# Patient Record
Sex: FEMALE | Race: OTHER | URBAN - METROPOLITAN AREA
[De-identification: names, ages, dates, MRNs, and addresses within clinical notes are randomized per-mention and may not be internally consistent; named-entity substitution may affect disease eponyms.]

---
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
## 2022-11-16 ENCOUNTER — OFFICE VISIT (OUTPATIENT)
Dept: FAMILY MEDICINE CLINIC | Age: 22
End: 2022-11-16

## 2022-11-16 VITALS
HEIGHT: 58 IN | OXYGEN SATURATION: 100 % | HEART RATE: 102 BPM | SYSTOLIC BLOOD PRESSURE: 118 MMHG | TEMPERATURE: 98.6 F | WEIGHT: 103.6 LBS | DIASTOLIC BLOOD PRESSURE: 66 MMHG | BODY MASS INDEX: 21.75 KG/M2

## 2022-11-16 DIAGNOSIS — Z3A.12 12 WEEKS GESTATION OF PREGNANCY: Primary | ICD-10-CM

## 2022-11-16 LAB
HCG URINE, QL. (POC): POSITIVE
VALID INTERNAL CONTROL?: YES

## 2022-11-16 PROCEDURE — 81025 URINE PREGNANCY TEST: CPT | Performed by: FAMILY MEDICINE

## 2022-11-16 PROCEDURE — 99203 OFFICE O/P NEW LOW 30 MIN: CPT | Performed by: FAMILY MEDICINE

## 2022-11-16 NOTE — PROGRESS NOTES
HISTORY OF PRESENT ILLNESS  Kamar Mahan is a 23 y.o. female. HPI  LMP 8/19/22, she has been feeling dizzy, and nauseous. She does feel some movement in her pelvic area  Review of Systems   Constitutional:  Negative for chills, fever and weight loss. Eyes:  Negative for blurred vision, double vision and photophobia. Respiratory:  Negative for cough, sputum production, shortness of breath and wheezing. Cardiovascular:  Negative for chest pain, palpitations, orthopnea and claudication. Gastrointestinal:  Negative for heartburn, nausea and vomiting. Genitourinary:  Negative for dysuria, frequency and urgency. /66 (BP 1 Location: Right upper arm, BP Patient Position: Sitting, BP Cuff Size: Adult)   Pulse (!) 102   Temp 98.6 °F (37 °C) (Temporal)   Ht 4' 10.47\" (1.485 m)   Wt 103 lb 9.6 oz (47 kg)   LMP 08/19/2022 (Approximate)   SpO2 100%   BMI 21.31 kg/m²   Physical Exam  Constitutional:       General: She is not in acute distress. HENT:      Head: Normocephalic. Right Ear: Tympanic membrane normal.      Left Ear: Tympanic membrane normal.   Cardiovascular:      Rate and Rhythm: Normal rate and regular rhythm. Pulses: Normal pulses. Heart sounds: Normal heart sounds. No murmur heard. Pulmonary:      Effort: Pulmonary effort is normal.      Breath sounds: Normal breath sounds. Abdominal:      General: There is distension. Palpations: Abdomen is soft. Comments: Gravid uterus palpable at umbilicus   Musculoskeletal:      Cervical back: Normal range of motion. No rigidity. Neurological:      Mental Status: She is alert.        ASSESSMENT and PLAN  Diagnoses and all orders for this visit:    1. 12 weeks gestation of pregnancy  -     AMB POC URINE PREGNANCY TEST, VISUAL COLOR COMPARISON      23year old female patient at 12 weeks of gestation based on the LMP given by patient,  to physical exam, seems to be further than LMP,  we will refer to OB to start prenatal care  Continue prenatal vitamins

## 2022-11-16 NOTE — PROGRESS NOTES
Coordination of Care  1. Have you been to the ER, urgent care clinic since your last visit? Hospitalized since your last visit? No    2. Have you seen or consulted any other health care providers outside of the [address] [place] [address] since your last visit? Include any pap smears or colon screening. No    Does the patient need refills? NO    Learning Assessment Complete?  yes  Depression Screening complete in the past 12 months? yes    Results for orders placed or performed in visit on 11/16/22   AMB POC URINE PREGNANCY TEST, VISUAL COLOR COMPARISON   Result Value Ref Range    VALID INTERNAL CONTROL POC Yes     HCG urine, Ql. (POC) Positive Negative

## 2022-11-16 NOTE — PROGRESS NOTES
Name and  confirmed w/ patient. An After Visit Summary was provided and all discharge instructions were reviewed with the patient including: prenatal resources. Patient stated she would like to try to go to [facility] for prenatal care. She will attempt to call on her own, but contact us if she has any problems. She is already taking a prenatal vitamin and will continue. Time for questions and answers provided, patient verbalized understanding. Patient discharged from clinic in stable condition. PATRICIA  Alex Dejesus assisted with this d/c.

## 2023-01-19 LAB
ANTIBODY SCREEN, EXTERNAL: NEGATIVE
CHLAMYDIA, EXTERNAL: NEGATIVE
HBSAG, EXTERNAL: NEGATIVE
HEPATITIS C AB,   EXT: NEGATIVE
HIV, EXTERNAL: NEGATIVE
N. GONORRHEA, EXTERNAL: NEGATIVE
RPR, EXTERNAL: NON REACTIVE
RUBELLA, EXTERNAL: NORMAL
TYPE, ABO & RH, EXTERNAL: NORMAL

## 2023-02-23 DIAGNOSIS — O26.843 UTERINE SIZE-DATE DISCREPANCY IN THIRD TRIMESTER: Primary | ICD-10-CM

## 2023-02-27 ENCOUNTER — ROUTINE PRENATAL (OUTPATIENT)
Dept: FAMILY MEDICINE CLINIC | Age: 23
End: 2023-02-27
Payer: COMMERCIAL

## 2023-02-27 VITALS — RESPIRATION RATE: 17 BRPM | HEIGHT: 58 IN | WEIGHT: 112 LBS | BODY MASS INDEX: 23.51 KG/M2 | OXYGEN SATURATION: 98 %

## 2023-02-27 DIAGNOSIS — Z3A.37 37 WEEKS GESTATION OF PREGNANCY: Primary | ICD-10-CM

## 2023-02-27 PROBLEM — O99.019 ANEMIA OF PREGNANCY: Status: ACTIVE | Noted: 2023-02-27

## 2023-02-27 RX ORDER — LANOLIN ALCOHOL/MO/W.PET/CERES
CREAM (GRAM) TOPICAL
COMMUNITY
Start: 2023-01-24

## 2023-02-27 RX ORDER — BREAST PUMP
EACH MISCELLANEOUS AS DIRECTED
COMMUNITY
Start: 2023-02-23

## 2023-02-27 NOTE — PROGRESS NOTES
Return OB Visit     AMN   Subjective:   Kamar Oliver Corpus 23 y.o.   LUIS ENRIQUE: Not found. GA:  Unknown. Concerns today: none    LOF: No  Vaginal bleeding: No  Fetal movement (after 20 weeks): Yes  Contractions: No    Pt denies fever, chills, HA, vision disturbances, RUQ pain, chest pain, SOB, N/V/D, constipation, urinary problems, foul smelling vaginal discharge, LE Edema worse than usual.     OB History    Para Term  AB Living   1             SAB IAB Ectopic Molar Multiple Live Births                    # Outcome Date GA Lbr Justo/2nd Weight Sex Delivery Anes PTL Lv   1 Current                Allergies- reviewed:   No Known Allergies  Medications- reviewed:   Current Outpatient Medications   Medication Sig    PNV Comb #2-Iron-FA-Omega 3 29-1-400 mg cmpk Take  by mouth. Breast Pump najma as directed. ferrous sulfate 325 mg (65 mg iron) tablet 1 tablet     No current facility-administered medications for this visit. Past Medical History- reviewed:  History reviewed. No pertinent past medical history. Past Surgical History- reviewed:   History reviewed. No pertinent surgical history.   Social History- reviewed:  Social History     Socioeconomic History    Marital status: SINGLE     Spouse name: Not on file    Number of children: Not on file    Years of education: Not on file    Highest education level: Not on file   Occupational History    Not on file   Tobacco Use    Smoking status: Never    Smokeless tobacco: Never   Substance and Sexual Activity    Alcohol use: Never    Drug use: Never    Sexual activity: Not on file   Other Topics Concern    Not on file   Social History Narrative    Not on file     Social Determinants of Health     Financial Resource Strain: Not on file   Food Insecurity: No Food Insecurity    Worried About Running Out of Food in the Last Year: Never true    Ran Out of Food in the Last Year: Never true   Transportation Needs: Not on file   Physical Activity: Not on file   Stress: Not on file   Social Connections: Not on file   Intimate Partner Violence: Not on file   Housing Stability: Low Risk     Unable to Pay for Housing in the Last Year: No    Number of Places Lived in the Last Year: 1    Unstable Housing in the Last Year: No     Immunizations- reviewed:   Immunization History   Administered Date(s) Administered    Tdap 2023       Objective:   Visit Vitals  BP (P) 115/70   Pulse (P) 84   Temp (P) 98.2 °F (36.8 °C) (Oral)   Resp 17   Ht 4' 10\" (1.473 m)   Wt 112 lb (50.8 kg)   LMP 2022 (Approximate)   BMI 23.41 kg/m²       Physical Exam:  GENERAL APPEARANCE: alert, well appearing, in no apparent distress  ABDOMEN: gravid, Fundal height 30 FHT present at 145  bpm  PSYCH: normal mood and affect     Labs  No results found for this or any previous visit (from the past 12 hour(s)). Assessment         ICD-10-CM ICD-9-CM    1. 37 weeks gestation of pregnancy  Z3A.37 V22.2             Plan   23 y.o.  Unknown LUIS ENRIQUE Not found. here for return OB visit     1. SIUP at Unknown  -PNL:O+, Ab screen neg, Hgb frac wnl, Hgb 9.5. G/C/Tpall/HIV/Hep B/Hep C negative. Rubella/varicella immune. UA wnl, Ucx mixed urogenital caleb. 3rd tri Hgb 10.3, G/G/RPR/HIV neg. 1hr GTT wnl. GBS negative.  -Immunizations: s/p TDAP  -Ultrasound: 23, EGA more advanced than LMP (31w6d instead of 21w6d). Limited views but normal anatomy, EFW 53%ile. Fundal placenta. Cephalic. Repeat anatomy and growth scan scheduled . PREGNANCY CONCERNS    Anemia of pregnancy: 3rd trimester Hgb 10.3. Pt on iron supplementation. BIRTH PLAN  Continuity Provider: Pop matias. in labor: Considering epidural, would like to keep options open  Postpartum contraception: considering options  Feeding plan: breast  Social: Denies Tobacco, EtoH or illict drugs. Orders Placed This Encounter    PNV Comb #2-Iron-FA-Omega 3 29-1-400 mg cmpk     Sig: Take  by mouth.     Breast Pump najma     Sig: as directed. ferrous sulfate 325 mg (65 mg iron) tablet     Si tablet     Labor precautions discussed, including: Regular painful contractions, lasting for greater than one hour, taking your breath away; any vaginal bleeding; any leakage of fluid; or absent or decreased fetal movement. Call M.D. on call if any of these symptoms or signs occur. I have discussed the diagnosis with the patient and the intended plan as seen in the above orders. The patient has received an after-visit summary and questions were answered concerning future plans. I have discussed medication side effects and warnings with the patient as well. Informed pt to return to the office or go to the ER if she experiences vaginal bleeding, vaginal discharge, leaking of fluid, pelvic cramping.     Pt seen and discussed with Dr. Mo Mcdaniel (attending physician)    Sonia Castro MD  Family Medicine Resident

## 2023-02-27 NOTE — PROGRESS NOTES
I reviewed with the resident the medical history and the resident's findings on the physical examination. I discussed with the resident the patient's diagnosis and concur with the plan.     21yo G1 @ 37w2d by 31 wk scan   IUP: has MFM scan scheduled tomorrow, GTT normal, GBS neg   Anemia: on iron, hbg was 10.3 on last check 2/23  Poor dating criteria: 31wk scan

## 2023-02-27 NOTE — PROGRESS NOTES
Chief Complaint   Patient presents with    Routine Prenatal Visit     . No bleeding or LOF.  +FM. 1. Have you been to the ER, urgent care clinic since your last visit? Hospitalized since your last visit? N/A.    2. Have you seen or consulted any other health care providers outside of the [address] [place] [address] since your last visit? Include any pap smears or colon screening.  N/A.

## 2023-02-28 ENCOUNTER — HOSPITAL ENCOUNTER (OUTPATIENT)
Dept: PERINATAL CARE | Age: 23
Discharge: HOME | End: 2023-02-28
Attending: OBSTETRICS & GYNECOLOGY
Payer: COMMERCIAL

## 2023-02-28 LAB — GRBS, EXTERNAL: NEGATIVE

## 2023-02-28 PROCEDURE — 76805 OB US >/= 14 WKS SNGL FETUS: CPT | Performed by: OBSTETRICS & GYNECOLOGY

## 2023-03-06 ENCOUNTER — ANESTHESIA (OUTPATIENT)
Dept: LABOR AND DELIVERY | Age: 23
DRG: 560 | End: 2023-03-06
Payer: COMMERCIAL

## 2023-03-06 ENCOUNTER — HOSPITAL ENCOUNTER (INPATIENT)
Age: 23
LOS: 2 days | Discharge: HOME | DRG: 560 | End: 2023-03-08
Attending: FAMILY MEDICINE | Admitting: FAMILY MEDICINE
Payer: COMMERCIAL

## 2023-03-06 ENCOUNTER — ANESTHESIA EVENT (OUTPATIENT)
Dept: LABOR AND DELIVERY | Age: 23
DRG: 560 | End: 2023-03-06
Payer: COMMERCIAL

## 2023-03-06 DIAGNOSIS — O99.019 ANTEPARTUM ANEMIA: Primary | ICD-10-CM

## 2023-03-06 DIAGNOSIS — Z34.90 PREGNANCY, UNSPECIFIED GESTATIONAL AGE: ICD-10-CM

## 2023-03-06 LAB
ALBUMIN SERPL-MCNC: 2.8 G/DL (ref 3.5–5)
ALBUMIN/GLOB SERPL: 0.8 (ref 1.1–2.2)
ALP SERPL-CCNC: 279 U/L (ref 45–117)
ALT SERPL-CCNC: 11 U/L (ref 12–78)
ANION GAP SERPL CALC-SCNC: 8 MMOL/L (ref 5–15)
AST SERPL-CCNC: 14 U/L (ref 15–37)
BASOPHILS # BLD: 0 K/UL (ref 0–0.1)
BASOPHILS NFR BLD: 0 % (ref 0–1)
BILIRUB SERPL-MCNC: 0.5 MG/DL (ref 0.2–1)
BUN SERPL-MCNC: 8 MG/DL (ref 6–20)
BUN/CREAT SERPL: 13 (ref 12–20)
CALCIUM SERPL-MCNC: 9 MG/DL (ref 8.5–10.1)
CHLORIDE SERPL-SCNC: 110 MMOL/L (ref 97–108)
CO2 SERPL-SCNC: 21 MMOL/L (ref 21–32)
CREAT SERPL-MCNC: 0.63 MG/DL (ref 0.55–1.02)
DIFFERENTIAL METHOD BLD: ABNORMAL
EOSINOPHIL # BLD: 0 K/UL (ref 0–0.4)
EOSINOPHIL NFR BLD: 0 % (ref 0–7)
ERYTHROCYTE [DISTWIDTH] IN BLOOD BY AUTOMATED COUNT: 15.8 % (ref 11.5–14.5)
GLOBULIN SER CALC-MCNC: 3.7 G/DL (ref 2–4)
GLUCOSE SERPL-MCNC: 85 MG/DL (ref 65–100)
HCT VFR BLD AUTO: 34.3 % (ref 35–47)
HGB BLD-MCNC: 11 G/DL (ref 11.5–16)
IMM GRANULOCYTES # BLD AUTO: 0.1 K/UL (ref 0–0.04)
IMM GRANULOCYTES NFR BLD AUTO: 1 % (ref 0–0.5)
LYMPHOCYTES # BLD: 1.5 K/UL (ref 0.8–3.5)
LYMPHOCYTES NFR BLD: 17 % (ref 12–49)
MCH RBC QN AUTO: 28.6 PG (ref 26–34)
MCHC RBC AUTO-ENTMCNC: 32.1 G/DL (ref 30–36.5)
MCV RBC AUTO: 89.1 FL (ref 80–99)
MONOCYTES # BLD: 0.5 K/UL (ref 0–1)
MONOCYTES NFR BLD: 6 % (ref 5–13)
NEUTS SEG # BLD: 6.9 K/UL (ref 1.8–8)
NEUTS SEG NFR BLD: 76 % (ref 32–75)
NRBC # BLD: 0 K/UL (ref 0–0.01)
NRBC BLD-RTO: 0 PER 100 WBC
PLATELET # BLD AUTO: 182 K/UL (ref 150–400)
POTASSIUM SERPL-SCNC: 3.8 MMOL/L (ref 3.5–5.1)
PROT SERPL-MCNC: 6.5 G/DL (ref 6.4–8.2)
RBC # BLD AUTO: 3.85 M/UL (ref 3.8–5.2)
SODIUM SERPL-SCNC: 139 MMOL/L (ref 136–145)
TSH SERPL DL<=0.05 MIU/L-ACNC: 1.51 UIU/ML (ref 0.36–3.74)
WBC # BLD AUTO: 9.1 K/UL (ref 3.6–11)

## 2023-03-06 PROCEDURE — 4A1HXCZ MONITORING OF PRODUCTS OF CONCEPTION, CARDIAC RATE, EXTERNAL APPROACH: ICD-10-PCS | Performed by: FAMILY MEDICINE

## 2023-03-06 PROCEDURE — 75410000002 HC LABOR FEE PER 1 HR: Performed by: ADVANCED PRACTICE MIDWIFE

## 2023-03-06 PROCEDURE — 0KQM0ZZ REPAIR PERINEUM MUSCLE, OPEN APPROACH: ICD-10-PCS | Performed by: FAMILY MEDICINE

## 2023-03-06 PROCEDURE — 74011000250 HC RX REV CODE- 250: Performed by: NURSE ANESTHETIST, CERTIFIED REGISTERED

## 2023-03-06 PROCEDURE — 74011250636 HC RX REV CODE- 250/636

## 2023-03-06 PROCEDURE — 75810000275 HC EMERGENCY DEPT VISIT NO LEVEL OF CARE

## 2023-03-06 PROCEDURE — 36415 COLL VENOUS BLD VENIPUNCTURE: CPT

## 2023-03-06 PROCEDURE — 74011250637 HC RX REV CODE- 250/637

## 2023-03-06 PROCEDURE — 84443 ASSAY THYROID STIM HORMONE: CPT

## 2023-03-06 PROCEDURE — 75410000003 HC RECOV DEL/VAG/CSECN EA 0.5 HR: Performed by: ADVANCED PRACTICE MIDWIFE

## 2023-03-06 PROCEDURE — 76060000078 HC EPIDURAL ANESTHESIA: Performed by: NURSE ANESTHETIST, CERTIFIED REGISTERED

## 2023-03-06 PROCEDURE — 93005 ELECTROCARDIOGRAM TRACING: CPT

## 2023-03-06 PROCEDURE — 74011250636 HC RX REV CODE- 250/636: Performed by: OBSTETRICS & GYNECOLOGY

## 2023-03-06 PROCEDURE — 75410000000 HC DELIVERY VAGINAL/SINGLE: Performed by: ADVANCED PRACTICE MIDWIFE

## 2023-03-06 PROCEDURE — 77030005513 HC CATH URETH FOL11 MDII -B

## 2023-03-06 PROCEDURE — 77030014125 HC TY EPDRL BBMI -B: Performed by: ANESTHESIOLOGY

## 2023-03-06 PROCEDURE — 85025 COMPLETE CBC W/AUTO DIFF WBC: CPT

## 2023-03-06 PROCEDURE — 65270000029 HC RM PRIVATE

## 2023-03-06 PROCEDURE — 80053 COMPREHEN METABOLIC PANEL: CPT

## 2023-03-06 RX ORDER — SODIUM CHLORIDE 0.9 % (FLUSH) 0.9 %
5-40 SYRINGE (ML) INJECTION AS NEEDED
Status: DISCONTINUED | OUTPATIENT
Start: 2023-03-06 | End: 2023-03-08 | Stop reason: HOSPADM

## 2023-03-06 RX ORDER — SODIUM CHLORIDE, SODIUM LACTATE, POTASSIUM CHLORIDE, CALCIUM CHLORIDE 600; 310; 30; 20 MG/100ML; MG/100ML; MG/100ML; MG/100ML
125 INJECTION, SOLUTION INTRAVENOUS CONTINUOUS
Status: DISCONTINUED | OUTPATIENT
Start: 2023-03-06 | End: 2023-03-08 | Stop reason: HOSPADM

## 2023-03-06 RX ORDER — BUPIVACAINE HYDROCHLORIDE 2.5 MG/ML
INJECTION, SOLUTION EPIDURAL; INFILTRATION; INTRACAUDAL AS NEEDED
Status: DISCONTINUED | OUTPATIENT
Start: 2023-03-06 | End: 2023-03-06 | Stop reason: HOSPADM

## 2023-03-06 RX ORDER — DOCUSATE SODIUM 100 MG/1
100 CAPSULE, LIQUID FILLED ORAL
Status: DISCONTINUED | OUTPATIENT
Start: 2023-03-06 | End: 2023-03-08 | Stop reason: HOSPADM

## 2023-03-06 RX ORDER — LANOLIN ALCOHOL/MO/W.PET/CERES
1 CREAM (GRAM) TOPICAL
Status: DISCONTINUED | OUTPATIENT
Start: 2023-03-07 | End: 2023-03-08 | Stop reason: HOSPADM

## 2023-03-06 RX ORDER — SODIUM CHLORIDE 0.9 % (FLUSH) 0.9 %
5-40 SYRINGE (ML) INJECTION EVERY 8 HOURS
Status: DISCONTINUED | OUTPATIENT
Start: 2023-03-06 | End: 2023-03-08 | Stop reason: HOSPADM

## 2023-03-06 RX ORDER — FOLIC ACID/MULTIVIT,IRON,MINER 0.4MG-18MG
1 TABLET ORAL DAILY
Status: DISCONTINUED | OUTPATIENT
Start: 2023-03-07 | End: 2023-03-08 | Stop reason: HOSPADM

## 2023-03-06 RX ORDER — OXYTOCIN/RINGER'S LACTATE 30/500 ML
10 PLASTIC BAG, INJECTION (ML) INTRAVENOUS AS NEEDED
Status: DISCONTINUED | OUTPATIENT
Start: 2023-03-06 | End: 2023-03-08 | Stop reason: HOSPADM

## 2023-03-06 RX ORDER — NALOXONE HYDROCHLORIDE 0.4 MG/ML
0.4 INJECTION, SOLUTION INTRAMUSCULAR; INTRAVENOUS; SUBCUTANEOUS AS NEEDED
Status: DISCONTINUED | OUTPATIENT
Start: 2023-03-06 | End: 2023-03-06

## 2023-03-06 RX ORDER — FENTANYL/BUPIVACAINE/NS/PF 2-1250MCG
1-16 SYRINGE (ML) EPIDURAL CONTINUOUS
Status: DISCONTINUED | OUTPATIENT
Start: 2023-03-06 | End: 2023-03-06 | Stop reason: HOSPADM

## 2023-03-06 RX ORDER — HYDROCODONE BITARTRATE AND ACETAMINOPHEN 5; 325 MG/1; MG/1
1 TABLET ORAL
Status: DISCONTINUED | OUTPATIENT
Start: 2023-03-06 | End: 2023-03-06

## 2023-03-06 RX ORDER — OXYTOCIN/RINGER'S LACTATE 30/500 ML
87.3 PLASTIC BAG, INJECTION (ML) INTRAVENOUS AS NEEDED
Status: DISCONTINUED | OUTPATIENT
Start: 2023-03-06 | End: 2023-03-08 | Stop reason: HOSPADM

## 2023-03-06 RX ORDER — OXYTOCIN/RINGER'S LACTATE 30/500 ML
0-20 PLASTIC BAG, INJECTION (ML) INTRAVENOUS
OUTPATIENT
Start: 2023-03-06

## 2023-03-06 RX ORDER — IBUPROFEN 800 MG/1
800 TABLET ORAL EVERY 8 HOURS
Status: DISCONTINUED | OUTPATIENT
Start: 2023-03-06 | End: 2023-03-08 | Stop reason: HOSPADM

## 2023-03-06 RX ORDER — LIDOCAINE HYDROCHLORIDE AND EPINEPHRINE 15; 5 MG/ML; UG/ML
INJECTION, SOLUTION EPIDURAL
Status: SHIPPED | OUTPATIENT
Start: 2023-03-06 | End: 2023-03-06

## 2023-03-06 RX ORDER — MINERAL OIL
OIL (ML) ORAL
Status: DISPENSED
Start: 2023-03-06 | End: 2023-03-07

## 2023-03-06 RX ORDER — LIDOCAINE HYDROCHLORIDE 10 MG/ML
INJECTION INFILTRATION; PERINEURAL AS NEEDED
Status: DISCONTINUED | OUTPATIENT
Start: 2023-03-06 | End: 2023-03-06 | Stop reason: HOSPADM

## 2023-03-06 RX ORDER — OXYTOCIN/RINGER'S LACTATE 30/500 ML
87.3 PLASTIC BAG, INJECTION (ML) INTRAVENOUS AS NEEDED
Status: COMPLETED | OUTPATIENT
Start: 2023-03-06 | End: 2023-03-06

## 2023-03-06 RX ADMIN — SODIUM CHLORIDE, POTASSIUM CHLORIDE, SODIUM LACTATE AND CALCIUM CHLORIDE 125 ML/HR: 600; 310; 30; 20 INJECTION, SOLUTION INTRAVENOUS at 13:49

## 2023-03-06 RX ADMIN — BUPIVACAINE HYDROCHLORIDE 4 ML: 2.5 INJECTION, SOLUTION EPIDURAL; INFILTRATION; INTRACAUDAL; PERINEURAL at 15:57

## 2023-03-06 RX ADMIN — LIDOCAINE HYDROCHLORIDE AND EPINEPHRINE 3 ML: 15; 5 INJECTION, SOLUTION EPIDURAL; INFILTRATION; INTRACAUDAL; PERINEURAL at 14:38

## 2023-03-06 RX ADMIN — SODIUM CHLORIDE, POTASSIUM CHLORIDE, SODIUM LACTATE AND CALCIUM CHLORIDE 1000 ML: 600; 310; 30; 20 INJECTION, SOLUTION INTRAVENOUS at 12:13

## 2023-03-06 RX ADMIN — BUPIVACAINE HYDROCHLORIDE 1 ML: 2.5 INJECTION, SOLUTION EPIDURAL; INFILTRATION; INTRACAUDAL at 16:35

## 2023-03-06 RX ADMIN — IBUPROFEN 800 MG: 800 TABLET, FILM COATED ORAL at 22:12

## 2023-03-06 RX ADMIN — Medication 10 ML/HR: at 14:53

## 2023-03-06 RX ADMIN — LIDOCAINE HYDROCHLORIDE AND EPINEPHRINE 4 ML: 15; 5 INJECTION, SOLUTION EPIDURAL at 16:37

## 2023-03-06 RX ADMIN — LIDOCAINE HYDROCHLORIDE 3 ML: 10 INJECTION, SOLUTION INFILTRATION; PERINEURAL at 14:27

## 2023-03-06 RX ADMIN — OXYTOCIN 87.3 MILLI-UNITS/MIN: 10 INJECTION, SOLUTION INTRAMUSCULAR; INTRAVENOUS at 20:55

## 2023-03-06 RX ADMIN — BUPIVACAINE HYDROCHLORIDE 5 ML: 2.5 INJECTION, SOLUTION EPIDURAL; INFILTRATION; INTRACAUDAL; PERINEURAL at 14:41

## 2023-03-06 RX ADMIN — LIDOCAINE HYDROCHLORIDE AND EPINEPHRINE 2 ML: 15; 5 INJECTION, SOLUTION EPIDURAL; INFILTRATION; INTRACAUDAL; PERINEURAL at 14:40

## 2023-03-06 RX ADMIN — BUPIVACAINE HYDROCHLORIDE 4 ML: 2.5 INJECTION, SOLUTION EPIDURAL; INFILTRATION; INTRACAUDAL; PERINEURAL at 15:55

## 2023-03-06 NOTE — PROGRESS NOTES
65- OB residents notified of pt's arrival, will be at bedside when available     [id number]- Orders received for r/o labor from resident and Valerie Mclean MD

## 2023-03-06 NOTE — ANESTHESIA PREPROCEDURE EVALUATION
Relevant Problems   No relevant active problems       Anesthetic History   No history of anesthetic complications            Review of Systems / Medical History  Patient summary reviewed, nursing notes reviewed and pertinent labs reviewed    Pulmonary  Within defined limits                 Neuro/Psych   Within defined limits           Cardiovascular  Within defined limits                Exercise tolerance: >4 METS     GI/Hepatic/Renal  Within defined limits              Endo/Other  Within defined limits           Other Findings              Physical Exam    Airway  Mallampati: II  TM Distance: 4 - 6 cm  Neck ROM: normal range of motion   Mouth opening: Normal     Cardiovascular  Regular rate and rhythm,  S1 and S2 normal,  no murmur, click, rub, or gallop  Rhythm: regular  Rate: normal         Dental    Dentition: Lower braces and Upper braces     Pulmonary  Breath sounds clear to auscultation               Abdominal  GI exam deferred       Other Findings            Anesthetic Plan    ASA: 2  Anesthesia type: epidural            Anesthetic plan and risks discussed with: Patient      [place] #[id number]

## 2023-03-06 NOTE — PROGRESS NOTES
1056: Patient arrives to unit complaining of contractions starting at 0100 am today with of frequency of approximately every 3 minutes, patient then noted that around 0800 am contraction frequency increased to every 2 minutes. Patient states there is no vaginal bleeding or loss of fluid. 1119: Dr. Chad Linares at the bedside at this time assessing patient. 1215: Patient walking the hallways at this time to rule out labor per vernal order of resident and Dr. Mary Rick. 1339: Patient back in bed at this time. Vaginal exam completed by Yane Rice, noted to be 5/80/-2.     1415: Geovanna Mcmahon CRNA at the bedside at this time to place epidural. Nurse will continue to attempt fetal monitoring while getting epidural, however, may be difficult due to positioning. 1427: Timeout completed. 1438 Test dose. 1441: Bolus dose. 1515: Notified Dr. Mary Rick in person of patient's elevated heart rate, provider to order a TSH.     1550: Spoke to FRANCESCO Snachez at this time and notified that patient continues to feel right leg/right side of abdomen, complains of 3/10 pain in the area. Provider to see patient at bedside. 1628: Marybel Irizarry made decision with patient for replacement of epidural due to no relief of the right side. Nurse will continue to attempt fetal monitoring while getting epidural, however, may be difficult due to positioning. 1629: Removal of old epidural catheter at this time by Marybel Irizarry.     1631: Timeout completed at this time for CSE placement by [place], CRNA. 1637:Test dose. 1715: Spoke to resident  Dr. Mitch Polanco in person at this time regarding patient's continued tachycardia. Received orders for EKG and CMP at this time. Patient continues to deny any chest pain or shortness of breath. 1915: Bedside and Verbal shift change report given to TOD Patel (oncoming nurse) by Shyann Blakely RN and Martha Kelley RN (offgoing nurse).  Report included the following information SBAR, MAR, and Recent Results.

## 2023-03-06 NOTE — H&P
[address] [facility] [address] [place] [address]   Office [phone], Fax [phone]      History & Physical    Name: Raul Young MRN: [id number]  SSN: [id number]    YOB: 2000  Age: 23 y.o. Sex: female      Subjective:     Reason for Admission:  Pregnancy and Contractions    History of Present Illness: Ms. Jose Bowens is a 23 y.o.  female with an estimated gestational age of 36w4d with Estimated Date of Delivery: 3/18/23. Patient complains of contractions every 2-4 min since 1 AM this morning. She also reports mild vaginal discharge that is blood-tinged, no large gush of fluid. She has had abdominal pain since Saturday. Pregnancy has been complicated by anemia on oral iron. Patient denies abdominal pain  , chest pain, contractions, fever, headache , nausea and vomiting, right upper quadrant pain  , shortness of breath, swelling, and visual disturbances. OB History    Para Term  AB Living   1             SAB IAB Ectopic Molar Multiple Live Births                    # Outcome Date GA Lbr Justo/2nd Weight Sex Delivery Anes PTL Lv   1 Current              History reviewed. No pertinent past medical history. History reviewed. No pertinent surgical history. Social History     Occupational History    Not on file   Tobacco Use    Smoking status: Never    Smokeless tobacco: Never   Substance and Sexual Activity    Alcohol use: Never    Drug use: Never    Sexual activity: Not on file      History reviewed. No pertinent family history. No Known Allergies  Prior to Admission medications    Medication Sig Start Date End Date Taking? Authorizing Provider   PNV Comb #2-Iron-FA-Omega 3 29-1-400 mg cmpk Take  by mouth. Yes Provider, Historical   ferrous sulfate 325 mg (65 mg iron) tablet  23  Yes Provider, Historical   Breast Pump najma as directed.   Patient not taking: Reported on 3/6/2023 2/23/23   Provider, Historical        Review of Systems:  ROBERT comprehensive review of systems was negative except for that written in the History of Present Illness. Objective:     Vitals:    Vitals:    23 1100   BP: 121/78   Pulse: 97   Resp: 19   Temp: 99 °F (37.2 °C)   SpO2: 99%   Weight: 114 lb (51.7 kg)   Height: 4' 10\" (1.473 m)      Temp (24hrs), Av °F (37.2 °C), Min:99 °F (37.2 °C), Max:99 °F (37.2 °C)    BP  Min: 121/78  Max: 121/78     Physical Exam:  Patient with mild distress with contractions. Heart: Regular rate and rhythm, S1S2 present, or without murmur or extra heart sounds  Lung: clear to auscultation throughout lung fields, no wheezes, no rales, no rhonchi, and normal respiratory effort  Abdomen: soft, nontender  Cervical Exam: 3 cm dilated    60% effaced    -2 station  per Agustin Lima RN  Lower Extremities:  - Edema No     Membranes:  Intact  Fetal Heart Rate:  Reactive  Baseline: 135 per minute  Variability: moderate  Accelerations: yes  Decelerations: none  Uterine contractions: regular, every 5-6 minutes       Lab/Data Review:  No results found for this or any previous visit (from the past 24 hour(s)). Assessment and Plan:     Ms. Douglas Cranker is a 23 y.o.   female with an estimated gestational age of 36w4d who is admitted for latent labor. 1. Latent Labor: ctx q5-6 min on the monitor. Patient uncomfortable. 3/60%/-2 on exam, progressed from 0 dilation last week in clinic.  -   - Pain control: patient would like to try IV pain options (I.e. Stadol) first before epidural. She is amenable to epidural if necessary.  - PPH risk: low  - Regular diet  - Recheck in 2 hours, or if patient's discomfort increases. 2. Fetal Wellbeing: GBS neg, Cat 1 tracings on monitor. 3. SIUP at Unknown  -PNL:O+, Ab screen neg, Hgb frac wnl, Hgb 9.5. G/C/Tpall/HIV/Hep B/Hep C negative. Rubella/varicella immune. UA wnl, Ucx mixed urogenital caleb. 3rd tri Hgb 10.3, G/G/RPR/HIV neg. 1hr GTT wnl.  GBS negative.  -Immunizations: s/p TDAP  -Ultrasound: @37w3d: EFW 24%, 2.82g, HC < 1%, AC 39%, cephalic, posterior placenta, anatomy normal  -Anemia of pregnancy: 3rd trimester Hgb 10.3. Pt on iron supplementation. I have discussed the diagnosis with the patient and the intended plan as seen in the above orders. All questions were answered concerning future plans. I have discussed medication side effects and warnings with the patient as well. Patient discussed with Dr. Tamara Uriostegui.     Kari Andujar MD  Family Medicine Resident

## 2023-03-06 NOTE — PROGRESS NOTES
3/6/2023  2:17 PM    CM met with YUNG to complete initial assessment and begin discharge planning. MOB verified and confirmed demographics. YUNG lives with LOIS Chaudhary, at the address on file. YUNG reports she has good family support, and feels like she has the support she needs when she returns home. YUNG plans to breast and bottle feed baby. Pediatrician list provided to YUNG. YUNG has car seat, bassinet/crib, clothing, bottles and all necessary supplies for baby. Care Management Interventions  PCP Verified by CM: Yes ([facility])  Mode of Transport at Discharge:  Other (see comment)  Transition of Care Consult (CM Consult): Discharge Planning  Support Systems: Spouse/Significant Other, Other Family Member(s)  Confirm Follow Up Transport: Family  Discharge Location  Patient Expects to be Discharged to[de-identified] Home with family assistance  Darcy Hilton

## 2023-03-06 NOTE — ANESTHESIA PROCEDURE NOTES
Epidural Block    Patient location during procedure: OB  Start time: 3/6/2023 2:27 PM  End time: 3/6/2023 2:41 PM  Reason for block: labor epidural  Staffing  Performed: CRNA   Anesthesiologist: Rachelle Hawkins MD  Resident/CRNA: Brent Richard CRNA  Preanesthetic Checklist  Completed: patient identified, IV checked, site marked, risks and benefits discussed, surgical consent, monitors and equipment checked, pre-op evaluation, timeout performed and fire risk safety assessment completed and verbalized  Block Placement  Patient position: sitting  Prep: ChloraPrep  Sterility prep: cap, drape, gloves, mask and hand  Sedation level: no sedation  Patient monitoring: heart rate, frequent blood pressure checks and continuous pulse oximetry  Approach: midline  Location: lumbar  Lumbar location: L3-L4  Epidural  Loss of resistance technique: saline and air  Guidance: landmark technique  Needle  Needle type: Tuohy   Needle gauge: 17 G  Needle insertion depth: 5.5 cm  Catheter type: multi-orifice  Catheter size: 20 G  Catheter at skin depth: 10 cm  Catheter securement method: clear occlusive dressing and surgical tape  Test dose: negative  Medications Administered  lidocaine-EPINEPHrine (XYLOCAINE) 1.5 %-1:200,000 Epidural - Epidural   3 mL - 3/6/2023 2:38:00 PM  Assessment  Block outcome: pain improved  Number of attempts: 1  Procedure assessment: patient tolerated procedure well with no immediate complications

## 2023-03-06 NOTE — ANESTHESIA PROCEDURE NOTES
CSE Block    Start time: 3/6/2023 4:29 PM  End time: 3/6/2023 4:39 PM  Performed by: Rey Vieyra CRNA  Authorized by: David Marcus MD     Pre-Procedure  Indications: primary anesthetic    preanesthetic checklist: patient identified, risks and benefits discussed, anesthesia consent, site marked, patient being monitored, timeout performed and fire risk safety assessment completed and verbalized    Timeout Time: 16:31 EST      Procedure:   Patient Position:  Seated  Prep Region:  Lumbar  Location:  L3-4    Epidural Needle:   Needle Type:  Tuohy  Needle Gauge:  18 G  Injection Technique:  Loss of resistance using saline  Attempts:  1    Spinal Needle:   Needle Type:   Sammie  Needle Gauge:  27 G    Catheter:   Catheter Type:  Flex-tip  Catheter Size:  20 G  Catheter at Skin Depth (cm):  9  Events: no blood with aspiration, no cerebrospinal fluid with aspiration, no paresthesia and negative aspiration test    Test Dose:  Lidocaine-EPINEPHrine (XYLOCAINE) 1.5 %-1:200,000 injection Epidural - Epidural   4 mL - 3/6/2023 4:37:00 PM  Med Admin time: 3/6/2023 4:45 PM    Assessment:   Catheter Secured:  Tegaderm and tape  Insertion:  Uncomplicated  Patient tolerance:  Patient tolerated the procedure well with no immediate complications  Previous epidural cath removed @ 1629 tip intact

## 2023-03-07 PROCEDURE — 74011250637 HC RX REV CODE- 250/637

## 2023-03-07 PROCEDURE — 65270000029 HC RM PRIVATE

## 2023-03-07 PROCEDURE — 74011250637 HC RX REV CODE- 250/637: Performed by: ADVANCED PRACTICE MIDWIFE

## 2023-03-07 PROCEDURE — 99231 SBSQ HOSP IP/OBS SF/LOW 25: CPT | Performed by: FAMILY MEDICINE

## 2023-03-07 RX ORDER — ACETAMINOPHEN 325 MG/1
650 TABLET ORAL
Status: DISCONTINUED | OUTPATIENT
Start: 2023-03-07 | End: 2023-03-08 | Stop reason: HOSPADM

## 2023-03-07 RX ORDER — NALOXONE HYDROCHLORIDE 0.4 MG/ML
0.4 INJECTION, SOLUTION INTRAMUSCULAR; INTRAVENOUS; SUBCUTANEOUS AS NEEDED
Status: DISCONTINUED | OUTPATIENT
Start: 2023-03-07 | End: 2023-03-08 | Stop reason: HOSPADM

## 2023-03-07 RX ORDER — OXYTOCIN/RINGER'S LACTATE 30/500 ML
10 PLASTIC BAG, INJECTION (ML) INTRAVENOUS AS NEEDED
Status: DISCONTINUED | OUTPATIENT
Start: 2023-03-07 | End: 2023-03-08 | Stop reason: HOSPADM

## 2023-03-07 RX ORDER — ZOLPIDEM TARTRATE 5 MG/1
5 TABLET ORAL
Status: DISCONTINUED | OUTPATIENT
Start: 2023-03-07 | End: 2023-03-08 | Stop reason: HOSPADM

## 2023-03-07 RX ORDER — IBUPROFEN 800 MG/1
800 TABLET ORAL EVERY 8 HOURS
Status: DISCONTINUED | OUTPATIENT
Start: 2023-03-07 | End: 2023-03-08 | Stop reason: HOSPADM

## 2023-03-07 RX ORDER — FOLIC ACID/MULTIVIT,IRON,MINER 0.4MG-18MG
1 TABLET ORAL DAILY
Status: DISCONTINUED | OUTPATIENT
Start: 2023-03-07 | End: 2023-03-08 | Stop reason: HOSPADM

## 2023-03-07 RX ORDER — OXYTOCIN/RINGER'S LACTATE 30/500 ML
87.3 PLASTIC BAG, INJECTION (ML) INTRAVENOUS AS NEEDED
Status: DISCONTINUED | OUTPATIENT
Start: 2023-03-07 | End: 2023-03-08 | Stop reason: HOSPADM

## 2023-03-07 RX ADMIN — IBUPROFEN 800 MG: 800 TABLET, FILM COATED ORAL at 05:52

## 2023-03-07 RX ADMIN — IBUPROFEN 800 MG: 800 TABLET, FILM COATED ORAL at 14:46

## 2023-03-07 RX ADMIN — IBUPROFEN 800 MG: 800 TABLET, FILM COATED ORAL at 22:32

## 2023-03-07 NOTE — L&D DELIVERY NOTE
Delivery Summary    Patient: Adriana Bhakta MRN: [id number]  SSN: [id number]    YOB: 2000  Age: 23 y.o. Sex: female      Patient progressed well to C/C/+2 and pushed for approximately 30 min w/  over a 2nd degree perineal laceration of a liveborn female infant, Apgar scores were 9/9. Head delivered slightly KEVEN (Ex. ABDIRAHMAN). Anterior right shoulder delivered w/ single maternal effort w/ posterior shoulder and body following easily. Infant placed on maternal abdomen. Delayed cord clamping x 1 min. Cord clamped x 2 and cut by FOB. Pitocin infusion initiated. Placenta delivered spontaneously intact w/ 3 v cord. Perineum inspected. Fundus firm at U. Mother and baby bonding in LDR. Delivery QBL 300cc.       Information for the patient's :  Leo Liriano, Female Kamar [[id number]]     Labor Events:    Labor: No    Steroids: None   Cervical Ripening Date/Time:       Cervical Ripening Type: None   Antibiotics During Labor: No   Rupture Identifier: Sac 1    Rupture Date/Time: 3/6/2023 8:02 PM   Rupture Type: AROM   Amniotic Fluid Volume: Scant    Amniotic Fluid Description: Clear    Amniotic Fluid Odor: None    Induction: None       Induction Date/Time:        Indications for Induction:      Augmentation: Oxytocin   Augmentation Date/Time: 3/6/10333:18 PM   Indications for Augmentation: Ineffective Contraction Pattern   Labor complications: None       Additional complications:        Delivery Events:  Indications For Episiotomy:     Episiotomy: Midline   Perineal Laceration(s): 2nd   Repaired: Yes   Periurethral Laceration Location:      Repaired:     Labial Laceration Location:     Repaired:     Sulcal Laceration Location:     Repaired:     Vaginal Laceration Location:     Repaired:     Cervical Laceration Location:     Repaired:     Repair Suture: Vicryl 3-0   Number of Repair Packets:     Estimated Blood Loss (ml): 300ml   Quantitative Blood Loss (ml) Delivery Date: 3/6/2023    Delivery Time: 8:40 PM  Delivery Type: Vaginal, Spontaneous  Sex:  Female    Gestational Age: 36w4d   Delivery Clinician:  Kasey Mooney  Living Status: Living   Delivery Location: L&D            APGARS  One minute Five minutes Ten minutes   Skin color: 1   1        Heart rate: 2   2        Grimace: 2   2        Muscle tone: 2   2        Breathin   2        Totals: 9   9            Presentation: Vertex    Position: Left Occiput Anterior  Resuscitation Method:  Suctioning-bulb; Tactile Stimulation     Meconium Stained: None      Cord Information: 3 Vessels  Complications: None  Cord around:    Delayed cord clamping? Yes  Cord clamped date/time:3/6/2023  8:42 PM  Disposition of Cord Blood: Lab    Blood Gases Sent?: No    Placenta:  Date/Time: 3/6/2023  8:56 PM  Removal:        Appearance:       [place] Measurements:  Birth Weight:        Birth Length:        Head Circumference:        Chest Circumference:       Abdominal Girth: Other Providers:   REGINE SARAVIA;SPENCER MALDONADO;ANISH SIMONS;HANSA HOOVER;CHARITO COVARRUBIAS, Primary Nurse;Midwife;Nursery Nurse;Resident; Resident       Group B Strep:   Lab Results   Component Value Date/Time    GrBStrep, External negative 2023 12:00 AM     Information for the patient's :  Lianet Rustlatonya, Female Monik Moseley [[id number]]   No results found for: ABORH, PCTABR, PCTDIG, BILI, ABORHEXT, ABORH   No results for input(s): PCO2CB, PO2CB, HCO3I, SO2I, IBD, PTEMPI, SPECTI, PHICB, ISITE, IDEV, IALLEN in the last 72 hours.

## 2023-03-07 NOTE — PROGRESS NOTES
2300   Patient ambulated to Bathroom with assist x2. Patient voided and pericare done. Transferred to [facility] 64.    [phone]  Patient and infant transferred to post partum. Report given at bedside to Juan.

## 2023-03-07 NOTE — PROGRESS NOTES
Labor Progress Note  Patient seen, fetal heart rate and contraction pattern evaluated, patient examined. Patient having increasing pressure. Otherwise feeling fine. No headaches, chest pain, palpitations, or dyspnea. Patient Vitals for the past 1 hrs:   SpO2   23 1919 99 %   23 1914 99 %   23 1909 100 %   23 1904 99 %       Physical Exam:  Cervical Exam: 10/100/0  Membranes:  Intact  Uterine Activity: Frequency: Every 2-4 minutes  Fetal Heart Rate: Baseline: 140 per minute  Variability: moderate  Accelerations: yes  Decelerations: none      Assessment/Plan     Kamar Emeterio Murphy is a 23 y.o.  at 38w2d here in active labor. Labor: Now ctx q2-4 min. Pt complete. Will start pushing. GBS neg. Rh pos.  - Will start practicing pushing with patient  - Rh pos, GBS neg  - PPH risk low     2. Fetal Wellbeing: GBS neg, Cat 1 tracings on monitor.       Pt discussed with JAYME Samson MD  [facility] Practice Resident

## 2023-03-07 NOTE — PROGRESS NOTES
Labor Progress Note  Patient seen, fetal heart rate and contraction pattern evaluated, patient examined. Patient Vitals for the past 1 hrs:   BP Pulse   23 2234 138/88 (!) 108   23 2219 (!) 145/86 (!) 121       Physical Exam:  Cervical Exam:  Cervical Exam  Dilation (cm): 5  Eff: 80 %  Station: -2  Vaginal exam done by? : Karen Brandt, RN  Membrane Status: Intact  Membranes:  Intact  Uterine Activity: Frequency: Every 2-3 minutes and Intensity: moderate  Fetal Heart Rate: Reactive  Baseline: 140s per minute  Variability: moderate  Accelerations: yes  Decelerations: none  Uterine contractions: irregular, every 2-3 minutes  SVE: 5/80/-2      Assessment/Plan     Kamar Guzman is a 23 y.o. female  with an estimated gestational age of 36w4d who is admitted for latent labor. 1. Latent Labor   - Epidural pending  - continue to monitor tracings. Re-check SVE when cx stronger, more frequent, and more regular     2. Fetal Wellbeing GBS neg, Cat 1 tracings on monitor. 3. SIUP at Unknown  -PNL:O+, Ab screen neg, Hgb frac wnl, Hgb 9.5. G/C/Tpall/HIV/Hep B/Hep C negative. Rubella/varicella immune. UA wnl, Ucx mixed urogenital caleb. 3rd tri Hgb 10.3, G/G/RPR/HIV neg. 1hr GTT wnl. GBS negative.  -Immunizations: s/p TDAP  -Ultrasound: @37w3d: EFW 24%, 2.82g, HC < 1%, AC 09%, cephalic, posterior placenta, anatomy normal  -Anemia of pregnancy: 3rd trimester Hgb 10.3. Pt on iron supplementation.     Pt discussed with Dr. Dash Mast MD  [facility] Practice Resident

## 2023-03-07 NOTE — PROGRESS NOTES
[address] [facility] [address]   Office [phone], Fax [phone]                                Post-Partum Day Number 1 Progress Note    Patient doing well post-partum without significant complaint. Pain well controlled. Lochia minimal.  Has not had anything to eat since delivery yet. Ambulating. Voiding without difficulty. passing flatus. no BM. Vitals:  Patient Vitals for the past 8 hrs:   BP Temp Pulse Resp SpO2   23 0725 115/74 98.6 °F (37 °C) 84 16 98 %   23 0414 120/79 98.7 °F (37.1 °C) 84 16 98 %   23 0022 (!) 139/91 99 °F (37.2 °C) 92 16 99 %     Temp (24hrs), Av °F (37.2 °C), Min:98.6 °F (37 °C), Max:99.4 °F (37.4 °C)      Exam:  Patient without distress. CTAB, no w/r/r/c.               RRR, +S1 and S2, no m/r/g. Abdomen soft, fundus firm above umbilicus to the right, nontender. Lower extremities:  No edema. No palpable cords or tenderness. Lab/Data Review:  No results found for this or any previous visit (from the past 12 hour(s)). Assessment and Plan:    Kamar Ferguson is a 23 y.o.  s/p  at 38 weeks 2 days. Pregnancy was complicated by anemia on oral iron. Patient appears to be having uncomplicated post-partum course. Continue routine perineal care and maternal education. Plan discharge tomorrow if no problems occur. Patient discussed with Dr. Marcelino Callaway.                  Neli Porter MD  Family Medicine Resident

## 2023-03-07 NOTE — LACTATION NOTE
This note was copied from a baby's chart. This is mother's first baby. Her baby was rooting and breast fed well during visit. Discussed with mother her plan for feeding. Reviewed the benefits of exclusive breast milk feeding during the hospital stay. Informed her of the risks of using formula to supplement in the first few days of life as well as the benefits of successful breast milk feeding; referred her to the Breastfeeding booklet about this information. She acknowledges understanding of information reviewed and states that it is her plan to breastfeed/bottle feed her infant. Will support her choice and offer additional information as needed. Encouraged mom to attempt feeding with baby led feeding cues. Just as sucking on fingers, rooting, mouthing. Looking for 8-12 feedings in 24 hours. Don't limit baby at breast, allow baby to come of breast on it's own. Baby may want to feed  often and may increase number of feedings on second day of life. Skin to skin encouraged. If baby doesn't nurse,  Mom should  hand express  10-20 drops of colostrum and drip into baby's mouth, or give to baby by finger feeding, cup feeding, or spoon feeding at least every 2-3 hours. Mother does not have a breast pump for home use - [employer] hand pump given with instructions for use. Reviewed storage and preparation of expressed breast milk for baby. Mother will successfully establish breastfeeding by feeding in response to early feeding cues   or wake every 3h, will obtain deep latch, and will keep log of feedings/output. Taught to BF at hunger cues and or q 2-3 hrs and to offer 10-20 drops of hand expressed colostrum at any non-feeds.       Breast Assessment  Left Breast: Small , Medium  Left Nipple: Everted, Intact, Short  Right Breast: Small , Medium  Right Nipple: Everted, Intact, Short  Breast- Feeding Assessment  Attends Breast-Feeding Classes: No  Breast-Feeding Experience: No  Breast Trauma/Surgery: No  Type/Quality: Good  Lactation Consultant Visits  Breast-Feedings: Good  (Several drops of colostrum hand expressed into babys mouth to entice her to suckle - Baby latched on well to right breast (with assistance) in football hold and nursed vigorously.)  Mother/Infant Observation  Mother Observation: Alignment, Holds breast, Breast comfortable, Close hold, Cramps  Infant Observation: Audible swallows, Lips flanged, upper, Opens mouth, Feeding cues, Breast tissue moves, Frenulum checked, Rhythmic suck, Latches nipple and aereolae, Lips flanged, lower  LATCH Documentation  Latch: Grasps breast, tongue down, lips flanged, rhythmic sucking  Audible Swallowing: A few with stimulation  Type of Nipple: Everted (after stimulation)  Comfort (Breast/Nipple): Soft/non-tender  Hold (Positioning): Full assist, teach one side, mother does other, staff holds  [facility] Score: 8     Breastfeeding handouts and [address] [facility] #given in [place].

## 2023-03-08 VITALS
SYSTOLIC BLOOD PRESSURE: 121 MMHG | HEART RATE: 80 BPM | DIASTOLIC BLOOD PRESSURE: 74 MMHG | BODY MASS INDEX: 23.93 KG/M2 | WEIGHT: 114 LBS | HEIGHT: 58 IN | RESPIRATION RATE: 14 BRPM | TEMPERATURE: 98.5 F | OXYGEN SATURATION: 99 %

## 2023-03-08 LAB
ATRIAL RATE: 116 BPM
CALCULATED P AXIS, ECG09: 46 DEGREES
CALCULATED R AXIS, ECG10: 29 DEGREES
CALCULATED T AXIS, ECG11: 26 DEGREES
DIAGNOSIS, 93000: NORMAL
P-R INTERVAL, ECG05: 114 MS
Q-T INTERVAL, ECG07: 330 MS
QRS DURATION, ECG06: 72 MS
QTC CALCULATION (BEZET), ECG08: 458 MS
VENTRICULAR RATE, ECG03: 116 BPM

## 2023-03-08 PROCEDURE — 74011250637 HC RX REV CODE- 250/637

## 2023-03-08 RX ORDER — DOCUSATE SODIUM 100 MG/1
100 CAPSULE, LIQUID FILLED ORAL
Qty: 30 CAPSULE | Refills: 0 | Status: SHIPPED | OUTPATIENT
Start: 2023-03-08 | End: 2023-06-06

## 2023-03-08 RX ORDER — FOLIC ACID/MULTIVIT,IRON,MINER 0.4MG-18MG
1 TABLET ORAL DAILY
Qty: 30 TABLET | Refills: 0 | Status: SHIPPED | OUTPATIENT
Start: 2023-03-08 | End: 2023-04-07

## 2023-03-08 RX ORDER — IBUPROFEN 800 MG/1
800 TABLET ORAL
Qty: 30 TABLET | Refills: 0 | Status: SHIPPED | OUTPATIENT
Start: 2023-03-08 | End: 2023-04-07

## 2023-03-08 RX ORDER — LANOLIN ALCOHOL/MO/W.PET/CERES
325 CREAM (GRAM) TOPICAL EVERY OTHER DAY
Qty: 15 TABLET | Refills: 0 | Status: SHIPPED | OUTPATIENT
Start: 2023-03-08 | End: 2023-04-07

## 2023-03-08 RX ADMIN — IBUPROFEN 800 MG: 800 TABLET, FILM COATED ORAL at 06:56

## 2023-03-08 NOTE — DISCHARGE SUMMARY
Obstetrical Discharge Summary     Name: Malgorzata Fagan MRN: [id number]  SSN: [id number]    YOB: 2000  Age: 23 y.o. Sex: female      Admit Date: 3/6/2023    Discharge Date: 3/8/2023     Admitting Physician: Lily Yan DO     Attending Physician:  Luz Andrew DO    Admission Diagnoses: Pregnancy [Z34.90]    Discharge Diagnoses:   Information for the patient's :  John Berman, Female [address] [[id number]]   Delivery of a 2.52 kg female infant via Vaginal, Spontaneous on 3/6/2023 at 8:40 PM  by [employer] Spangler. Apgars were 9  and 9 . Additional Diagnoses:   Hospital Problems  Date Reviewed: 2023            Codes Class Noted POA    Pregnancy ICD-10-CM: Z34.90  ICD-9-CM: V22.2  3/6/2023 Unknown          Lab Results   Component Value Date/Time    Rubella, External immune 2023 12:00 AM    GrBStrep, External negative 2023 12:00 AM       Immunization(s):   Immunization History   Administered Date(s) Administered    Tdap 2023        Hospital Course:   Patient is a 23 y.o. Willetta Shapleigh s/p  at 38 weeks 2 days. Presented for   contractions  in setting of labor. Pregnancy complicated by anemia, limited prenatal care, poor dating. Labor and delivery complicated by maternal tachycardia and 2nd degree laceration. Delivered TLFI by  without complications. Normal hospital course following the delivery uncomplicated. On day of discharge patient reported minimal lochia, well controlled pain, and no other complaints. Discharged with pain regimen and bowel regimen. Advised to continue prenatal vitamins, and iron every other day. Patient had several elevated BP readings while inpatient. Will discharge home with BP cuff, ED precautions given. Will arrange for BP check at baby's weight check on 3/9. Six week postpartum visit to be scheduled with [facility].      Depression Scale         Postpartum Depression: Medium Risk    Last EPDS Total Score: 5 Last EPDS Self Harm Result: Never     Follow up test at discharge:none  Condition at Discharge:  stable  Disposition: Discharge to Home    Physical exam:  Visit Vitals  /74 (BP 1 Location: Left arm, BP Patient Position: At rest)   Pulse 80   Temp 98.5 °F (36.9 °C)   Resp 14   Ht 4' 10\" (1.473 m)   Wt 51.7 kg (114 lb)   LMP 08/19/2022 (Approximate)   SpO2 99%   Breastfeeding Unknown   BMI 23.83 kg/m²       Exam:  Patient without distress. CTAB, no w/r/r/c               RRR, + S1 and S2, no m/r/g               Abdomen soft, fundus firm at level of umbilicus, non tender               Lower extremities are negative for swelling, cords or tenderness. Patient Instructions:   Current Discharge Medication List        START taking these medications    Details   docusate sodium (COLACE) 100 mg capsule Take 1 Capsule by mouth daily as needed for Constipation for up to 90 days. Qty: 30 Capsule, Refills: 0  Start date: 3/8/2023, End date: 6/6/2023      ibuprofen (MOTRIN) 800 mg tablet Take 1 Tablet by mouth every eight (8) hours as needed for Pain for up to 30 days. Qty: 30 Tablet, Refills: 0  Start date: 3/8/2023, End date: 4/7/2023      prenatal vitamin 28 mg iron- 800 mcg tab tablet Take 1 Tablet by mouth daily for 30 days. Qty: 30 Tablet, Refills: 0  Start date: 3/8/2023, End date: 4/7/2023           CONTINUE these medications which have CHANGED    Details   ferrous sulfate 325 mg (65 mg iron) tablet Take 1 Tablet by mouth every other day for 30 days. Qty: 15 Tablet, Refills: 0  Start date: 3/8/2023, End date: 4/7/2023           CONTINUE these medications which have NOT CHANGED    Details   Breast Pump najma as directed. STOP taking these medications       PNV Comb #2-Iron-FA-Omega 3 29-1-400 mg cmpk Comments:   Reason for Stopping:               Reference my discharge instructions.       Signed By:  Juan Jose Renae MD    Family Medicine Resident

## 2023-03-08 NOTE — PROGRESS NOTES
[address] [facility] [address]   Office [phone], Fax [phone]                                Post-Partum Day Number 1 Progress Note    Patient doing well post-partum without significant complaint. Pain well controlled. Lochia minimal.  Tolerating PO. Ambulating. Voiding without difficulty. Passing flatus. no BM. Vitals:  Patient Vitals for the past 8 hrs:   BP Temp Pulse Resp SpO2   23 0745 121/74 98.5 °F (36.9 °C) 80 14 99 %     Temp (24hrs), Av.2 °F (36.8 °C), Min:97.9 °F (36.6 °C), Max:98.5 °F (36.9 °C)      Exam:  Patient without distress. CTAB, no w/r/r/c.               RRR, +S1 and S2, no m/r/g. Abdomen soft, fundus firm above umbilicus, nontender. Lower extremities:  No edema. No palpable cords or tenderness. Lab/Data Review:  No results found for this or any previous visit (from the past 12 hour(s)). Assessment and Plan:    Kamar Bowens is a 23 y.o.  s/p  at 38 weeks 2 days. Pregnancy was complicated by anemia on oral iron. Labor and delivery c/b maternal tachycardia in the 130s, which resolved after delivery. Tachycardia workup including EKG, TSH, and CMP were unremarkable. Several elevated BP reads overnight; but normal reads since. Patient appears to be having uncomplicated post-partum course. Continue routine perineal care and maternal education. Plan discharge today if no problems occur. Patient discussed with Dr. Romero Barksdale.                  Robyn Peguero MD  Family Medicine Resident

## 2023-03-08 NOTE — LACTATION NOTE
This note was copied from a baby's chart. Mother has been breast/formula feeding her baby. She was giving her baby a bottle when LC came to visit. LC discussed the following:    Reviewed breastfeeding basics:  Supply and demand,  stomach size, early  Feeding cues, skin to skin, positioning and baby led latch-on, assymetrical latch with signs of good, deep latch vs shallow, feeding frequency and duration, and log sheet for tracking infant feedings and output. Breastfeeding Booklet and Warm line information given. Discussed typical  weight loss and the importance of infant weight checks with pediatrician 1-2 post discharge. Discussed eating a healthy diet. Instructed mother to eat a variety of foods in order to get a well balanced diet. She should consume an extra 500 calories per day (more than her non-pregnant requirement.) These extra calories will help provide energy needed for optimal breast milk production. Mother also encouraged to \"drink to thirst\" and it is recommended that she drink fluids such as water, fruit/vegetable juice. Nutritious snacks should be available so that she can eat throughout the day to help satisfy her hunger and maintain a good milk supply. Mother given a hand pump with instructions for use. Reviewed how to store and prepare expressed breast milk for baby. Discussed what to do if she gets engorged or if nipples become sore:  Engorgement Care Guidelines:  Reviewed how milk is made and normal phases of milk production. Taught care of engorged breasts - physiologic breastfeeding encouraged with use of cool packs (no ice directly on skin). Consider use of NSAIDS where appropriate for discomfort and inflammation. Can employ light touch, lymphatic drainage techniques on tender grandular tissues. Anticipatory guidance shared.       Care for sore/tender nipples discussed:  ways to improve positioning and latch practiced and discussed, hand express colostrum after feedings and let air dry, light application of lanolin, hydrogel pads, seek comfortable laid back feeding position, start feedings on least sore side first.     Mother will successfully establish breastfeeding by feeding in response to early feeding cues   or wake every 3h, will obtain deep latch, and will keep log of feedings/output. Taught to BF at hunger cues and or q 2-3 hrs and to offer 10-20 drops of hand expressed colostrum at any non-feeds. Breast Assessment  Left Breast: Small , Medium  Left Nipple: Everted, Intact, Short  Right Breast: Small , Medium  Right Nipple: Everted, Intact, Short  Breast- Feeding Assessment  Attends Breast-Feeding Classes: No  Breast-Feeding Experience: No  Breast Trauma/Surgery: No  Type/Quality: Good (Mother states she is breastfeeding and formula feeding her baby.)  Lactation Consultant Visits  Breast-Feedings:  (Mother was formula feeding baby when [employer] came to visit (encouraged mother to offer baby breastmilk first). Mother states baby last breast fed at 0800 for 15 minutes. Encouraged mom to call [employer] if she breastfeeds again before D/C.)    Chart shows numerous feedings, void, stool WNL. Discussed importance of monitoring outputs and feedings on first week of life. Discussed ways to tell if baby is  getting enough breast milk, ie  voids and stools, change in color of stool, and return to birth wt within 2 weeks. Follow up with pediatrician visit for weight check in 1-2 days (per AAP guidelines.)  Encouraged to call Warm Line  [phone]  for any questions/problems that arise.  Mother also given breastfeeding support group dates and times for any future needs

## 2023-03-08 NOTE — DISCHARGE INSTRUCTIONS
Patient Discharge Instructions    Kamar Nielsenemily Hernandez / [id number] : 2000    Admitted 3/6/2023 Discharged: 3/8/2023       Por favor tenga leah documento presente en eisenberg eliza de seguimiento con eisenberg médico primario. Primary care provider:  Sadiq Walker    Discharging provider:  Kathrine Monterroso MD  - Family Medicine Resident  Charles Kenny DO  -  Family medicine attending          [address]:  Pregnancy [C09.72]      RECOMENDACIONES DE CUIDADO:     Lon Fischer llamada de la práctica familiar de [facility] sobre la hora y la fecha de eisenberg visita posparto de seis meses. You will receive a call from [facility] about the time and date of your six month postpartum visit. Continue Tratamiento:  - Por favor continue Motrin 800 mg, 1 tableta cada 8 horas por los próximos 2 días, luego 1 tableta cada 8 horas mientras sea necesario para el dolor.  - Por favor continue Colace 100 mg para el estreñimiento, tome 1 tableta dos veces al día hasta que pueda defercar regularmente sin necesidad del medicamento. - Por favor comience Ferrous Sulfate 325 mg para la anemia, [place] 1 tableta 1 veces día con jugo de naranja. - Por favor continue tomando deon vitaminas prenatales. Pruebas de seguimiento que necesita: N/A    Resultados pendientes:   En el momento de eisenberg de anna los resultados de las siguientes pruebas están pendiente:  N/A. Por favor discuta estos resultados con eisenberg proveedor primario en eisenberg eliza de seguimiento. Importante que [employer] siguientes síntomas: dolor de [place], falta de [place], [place], escalofríos, náusea, vómito, diarrea, cambios en eisenberg estado mental, caídas, debilidad y sangrado. DIETA/que comer:   Regular    ACTIVIDAD FÍSICA:   Instrucciones de [facility]    No levante nada que sea más pesado que eisenberg bebé por las próximas 6 semanas. No insertar nada por la vaginal por 6 semanas.  Luego del parto por cesárea/ vaginal debe evitar tener relaciones sexuales por 6 semanas. Tendrá eisenberg eliza de seguimiento posparto a las 6 semanas. Puede manejar eisneberg vehículo mientras no esté tomando Percocet ni ningún medicamento nárcotico (Motrin está alfredo). Cuidado de [place]:  llene el chrissy bottle con agua tibia y exprima hasta que salga un chorro de agua por la boquilla para ayudarle a limpiar el área perineal.      Entiendo que si surge algún problema cuando llegue a mi hogar puedo contactar a mi médico.    Me caal explicado las siguientes instrucciones y caal aclarado mis dudas. Entiendo y reconozco las instrucciones brindadas.                                                                                                                                                Firma de Davied Pancho / Ruthy Terrazas                                                                 Fecha/Hora                                                                                                                                              Firma de paciente ó representante                                                         Fecha/Hora

## 2023-03-08 NOTE — ROUTINE PROCESS
Bedside and verbal shift change report given to oncoming nurse, as assigned, by offgoing nurse, Glen Bergeron RN. Report included SBAR, Kardex, I&Os, Recent Results, Procedures, MAR, and changes in patient status. Oncoming nurse and patient given opportunity for questions.

## 2023-03-09 ENCOUNTER — OFFICE VISIT (OUTPATIENT)
Dept: FAMILY MEDICINE CLINIC | Age: 23
End: 2023-03-09

## 2023-03-09 ENCOUNTER — DOCUMENTATION ONLY (OUTPATIENT)
Dept: FAMILY MEDICINE CLINIC | Age: 23
End: 2023-03-09

## 2023-03-09 VITALS
HEART RATE: 95 BPM | WEIGHT: 103.8 LBS | BODY MASS INDEX: 21.69 KG/M2 | OXYGEN SATURATION: 98 % | DIASTOLIC BLOOD PRESSURE: 58 MMHG | TEMPERATURE: 96.9 F | SYSTOLIC BLOOD PRESSURE: 122 MMHG | RESPIRATION RATE: 14 BRPM

## 2023-03-09 DIAGNOSIS — Z01.30 BLOOD PRESSURE CHECK: Primary | ICD-10-CM

## 2023-03-09 PROCEDURE — 99213 OFFICE O/P EST LOW 20 MIN: CPT | Performed by: STUDENT IN AN ORGANIZED HEALTH CARE EDUCATION/TRAINING PROGRAM

## 2023-03-09 NOTE — PROGRESS NOTES
Room 18  : Leena #[id number]    Identified pt with two pt identifiers(name and ). Reviewed record in preparation for visit and have obtained necessary documentation. Chief Complaint   Patient presents with    Follow-up     Blood Pressure        Health Maintenance Due   Topic    Hepatitis C Screening     COVID-19 Vaccine (1)    HPV Age 9Y-34Y (1 - 2-dose series)    Flu Vaccine (1)       Visit Vitals  BP (!) 122/58 (BP 1 Location: Right upper arm, BP Patient Position: Sitting, BP Cuff Size: Adult)   Pulse 95   Temp 96.9 °F (36.1 °C) (Temporal)   Resp 14   Wt 103 lb 12.8 oz (47.1 kg)   SpO2 98%   BMI 21.69 kg/m²     [place] Postpartum Depression Scale given to mother to fill out. MD aware. Coordination of Care Questionnaire:  :   1) Have you been to an emergency room, urgent care, or hospitalized since your last visit? If yes, where when, and reason for visit? no       2. Have seen or consulted any other health care provider since your last visit? If yes, where when, and reason for visit? NO      Patient is accompanied by self I have received verbal consent from Nanci Villalba to discuss any/all medical information while they are present in the room.

## 2023-03-09 NOTE — PROGRESS NOTES
[address] [facility] [address] [address]   Office [phone], Fax [phone]      Chief Complaint:   Amrit Hector is a 23 y.o. female that presents for:     Chief Complaint   Patient presents with    Follow-up     Blood Pressure     Subjective:   HPI:  Kamar Mcclendon is a 23 y.o. female who presents to clinic for evaluation of elevated BP readings. She was recently hospitalized from 3/6-3/8 for delivery of pregnancy. She is a  who gave birth on 3/6 at 37 weeks and 2 days. Overnight after delivery she had elevated BP readings in the 140s-150s/80s-90s. She did not need any medication and her BP came back to normal range without intervention. She has been checking her BP at home and it has been ranging 120s/60s-70s. She does not have any symptoms of elevated BP including chest pain, shortness of breath, headaches, blurry vision, lower extremity swelling, or pain in the right upper quadrant. Health Maintenance:  Health Maintenance Due   Topic Date Due    Hepatitis C Screening  Never done    COVID-19 Vaccine (1) Never done    HPV Age 9Y-34Y (1 - 2-dose series) Never done    Flu Vaccine (1) Never done      ROS:   Review of Systems   Constitutional:  Negative for chills and fever. Respiratory:  Negative for cough and shortness of breath. Cardiovascular:  Negative for chest pain and leg swelling. Gastrointestinal:  Negative for abdominal pain, constipation, diarrhea, nausea and vomiting. Genitourinary:  Negative for dysuria, frequency and urgency. Skin:  Negative for itching and rash. Neurological:  Negative for dizziness and headaches. Past medical history, social history, and medications personally reviewed. No past medical history on file. Allergies personally reviewed. No Known Allergies   Objective:   Vitals reviewed.   Visit Vitals  BP (!) 122/58 (BP 1 Location: Right upper arm, BP Patient Position: Sitting, BP Cuff Size: Adult)   Pulse 95 Temp 96.9 °F (36.1 °C) (Temporal)   Resp 14   Wt 103 lb 12.8 oz (47.1 kg)   LMP 08/19/2022 (Approximate)   SpO2 98%   BMI 21.69 kg/m²      Physical Exam  Vitals Reviewed. General AO x 3. No distress. Not diaphoretic. No jaundice. No cyanosis. No pallor. Cardio Normal rate, regular rhythm. No murmur, rubs, or gallop. Pulmonary Effort normal. No accessory muscle use. No wheezes, rales, or rhonchi. Abdominal Soft. Bowel sounds normal. No tenderness. No distension. Extremities No edema of lower extremities. Pulses 2+. Assessment/Plan:   I personally reviewed the following Pertinent Labs/Studies:   - Encounter Notes from Hospital Stay for Delivery of Pregnancy from 3/6-3/8    Diagnoses and all orders for this visit:    1. Blood pressure check: BP today 122/58, had elevated BP readings the night after delivery on 3/6-3/7 while hospitalized, did not require intervention and BP returned to normal ranges, she has been checking her BP at home and it has been ranging 120s/60s-70s  - Advised patient to continue checking BP daily at home and bring BP log to postpartum visit  - Follow up in 11 days with baby for BP follow up     Follow up: Follow-up and Dispositions    Return in about 11 days (around 3/20/2023) for Follow up BP. Pt was discussed with Dr. Alma Porter (attending physician). I have reviewed pertinent labs results and other data. I have discussed the diagnosis with the patient and the intended plan as seen in the above orders. The patient has received an after-visit summary and questions were answered concerning future plans. I have discussed medication side effects and warnings with the patient as well.     Bonnie Bishop MD  Resident [facility]  03/09/23

## 2023-03-20 ENCOUNTER — OFFICE VISIT (OUTPATIENT)
Dept: FAMILY MEDICINE CLINIC | Age: 23
End: 2023-03-20

## 2023-03-20 VITALS
WEIGHT: 100.8 LBS | OXYGEN SATURATION: 99 % | TEMPERATURE: 98.8 F | HEART RATE: 92 BPM | BODY MASS INDEX: 21.07 KG/M2 | SYSTOLIC BLOOD PRESSURE: 106 MMHG | DIASTOLIC BLOOD PRESSURE: 69 MMHG

## 2023-03-20 DIAGNOSIS — R03.0 TRANSIENT ELEVATED BLOOD PRESSURE: Primary | ICD-10-CM

## 2023-03-20 PROCEDURE — 99212 OFFICE O/P EST SF 10 MIN: CPT | Performed by: STUDENT IN AN ORGANIZED HEALTH CARE EDUCATION/TRAINING PROGRAM

## 2023-03-20 NOTE — PROGRESS NOTES
[address] [facility]   [address] [address]  Tel: [phone]  Fax: [phone]    Chief Complaint:  Follow up on elevated blood pressure       Subjective  Kamar Martin is an 23 y.o. female who presents for blood pressure follow up. She has been checking blood pressure at home and states her readings are all normal. Her home readings are all in the low 100's, sometimes systolic in the 54'V  Last one was 105/65 at home. She has no chest pain, palpitations, lower extremity edema, shortness of breath, nausea, vomiting, abdominal pain, blurry vision or headaches. She is taking prenatal vitamins and some Ibuprofen prn for pain. ROS:   Review of Systems   All other systems reviewed and are negative. Past Medical History - reviewed:  Past Medical History:   Diagnosis Date    History of chicken pox     at age 8       Medications - reviewed:   Current Outpatient Medications   Medication Sig    ferrous sulfate 325 mg (65 mg iron) tablet Take 1 Tablet by mouth every other day for 30 days. ibuprofen (MOTRIN) 800 mg tablet Take 1 Tablet by mouth every eight (8) hours as needed for Pain for up to 30 days. (Patient not taking: Reported on 3/9/2023)    prenatal vitamin 28 mg iron- 800 mcg tab tablet Take 1 Tablet by mouth daily for 30 days. No current facility-administered medications for this visit. Past Surgical History - reviewed:   No past surgical history on file. Family History - reviewed:  No family history on file.     Allergies - reviewed:   No Known Allergies    Immunizations - reviewed:   Immunization History   Administered Date(s) Administered    DTaP 2002, 2002, 02/07/2003    HPV 12/27/2016    HPV (9-valent) 02/21/2017, 08/05/2017    Hep A Vaccine 12/27/2016    Hep A Vaccine 2 Dose Schedule (Ped/Adol) 08/05/2017    Hep B Vaccine 2002, 2002, 02/07/2003, 12/27/2016 Hib 2002, 2002, 2003    Influenza Vaccine 2016    MMR 2003, 2016    Meningococcal ACWY Vaccine 2016    Poliovirus vaccine 2002, 2002, 2003, 2016    Tdap 2016, 2023    Varicella Virus Vaccine 2016         Physical Exam  Visit Vitals  /69   Pulse 92   Temp 98.8 °F (37.1 °C) (Oral)   Wt 100 lb 12.8 oz (45.7 kg)   LMP 2022 (Approximate)   SpO2 99%   BMI 21.07 kg/m²       General AO x 3. No distress. Not diaphoretic. No jaundice. No cyanosis. No pallor. Cardio  Normal rate, regular rhythm. No murmur, rubs, or gallop. Pulmonary Effort normal. No accessory muscle use. No wheezes, rales, or rhonchi. Extremities No edema of lower extremities. Pulses 2+. Assessment/Plan    Kristinamrak Emeterio Johnson is an 23 y.o. female who presents for blood pressure check. ICD-10-CM ICD-9-CM    1. Transient elevated blood pressure  R03.0 796.2       - After , resolved. Doing well. EPDS score 3 (normal)  - Continue monitoring 2 to 3 times per week until 6 week pp check. Follow-up and Dispositions    Return for 6 week postpartum visit. I have discussed the diagnosis with the patient and the intended plan as seen in the above orders. Patient verbalized understanding of the plan and agrees with the plan. The patient has received an after-visit summary and questions were answered concerning future plans. I have discussed medication side effects and warnings with the patient as well. Informed patient to return to the office if new symptoms arise.     Patient discussed with Dr. Rene Jain (Attending Physician)     Bentley Abebe MD  Family Medicine Resident